# Patient Record
Sex: FEMALE | Race: WHITE
[De-identification: names, ages, dates, MRNs, and addresses within clinical notes are randomized per-mention and may not be internally consistent; named-entity substitution may affect disease eponyms.]

---

## 2018-03-10 NOTE — PHYS DOC
General


Chief Complaint:  DENTAL PROBLEM


Stated Complaint:  DENTAL PAIN


Time Seen by MD:  06:02


Source:  patient


Exam Limitations:  no limitations


Problems:  





History of Present Illness


Initial Comments


22-year-old  2 para 0 female states she is 30 weeks gestation comes to 

the ED complaining of dental pain.


Patient states that she's had long-term problems with her teeth, states that she

's had bilateral lower molar pain which worsened 2 days ago. When asked, she 

admits that she has not tried to contact her obstetrician. Also when asked, she 

states that she's having problems finding a dentist who will accept her [employer] 

[employer] insurance although admittedly shes only called a few.


No fever chills jaw pain headache, patient is able to eat and drink well and 

there is no swelling to cause any potential airway issues. She describes the 

pain as moderate and says the biggest issue with her pain is that it causes her 

to have some nausea although she has not vomited. Through the conversation it 

is revealed that if the nausea can be controlled patient will be satisfied, she 

understands that we use the least medications and pregnancy and avoid narcotics.


Timing/Duration:  gradual, last week


Severity:  moderate


Location:  dental


Prearrival Treatment:  over the counter meds


Modifying Factors:  improves with other


Associated Symptoms:  tooth pain


Allergies:  


Coded Allergies:  


     cefaclor (Verified  Allergy, Unknown, 3/10/18)





Past Medical History


Medical History:  no pertinent history


Surgical History:  noncontributory


LMP (Females 10-50):  pregnant





Social History


Smoker:  non-smoker


Alcohol:  none


Drugs:  none





Constitutional:  denies chills, denies diaphoresis, denies fever, denies malaise


Eyes:  denies blindness, denies blurred vision


Ears:  denies dizziness, denies pain, denies tinnitus


Nose:  denies clots, denies congestion, denies epistaxis


Mouth:  see HPI, denies clots, denies loose teeth, pain, denies swelling, 

denies clear discharge, denies purulent discharge


Throat:  denies pain, denies swelling, denies neck stiffness, denies painful 

swallowing, denies difficulty with fluids


Respiratory:  denies cough, denies shortness of breath


Cardiovascular:  denies chest pain, denies syncope


Gastrointestinal:  denies abdominal pain, denies diarrhea, nausea, denies 

vomiting


Neurological:  denies depressed, denies headache, denies numbness, denies 

paresthesia





Physical Exam


General Appearance:  WD/WN, no apparent distress


Eyes:  bilateral eye normal inspection, bilateral eye PERRL, bilateral eye EOMI


Ears:  bilateral ear auricle normal, bilateral ear canal normal, bilateral ear 

TM normal


Nose:  normal inspection


Mouth/Throat:  other (mild swelling of the gums at the left lower molars no 

discharge or open sores. No bony tenderness or visible dental decay. No other 

soft tissue swelling no airway compromise)


Neck:  normal inspection, trachea midline, limited range of motion


Cardiovascular/Respiratory:  normal breath sounds, no respiratory distress


Neurologic/Psychiatric:  CNs II-XII nml as tested, no motor/sensory deficits, 

alert, normal mood/affect, oriented x 3


Skin:  normal color, warm/dry





Orders, Labs, Meds


Zofran ODT given for the patient's nausea.





The patient called her OB representative from the emergency department and was 

advised to go to dentist today recommended no other prescription pain 

medications be given.


Departure


Time of Disposition:  06:28


Disposition:  01 HOME, SELF-CARE


Diagnosis:  dental caries, pregnancy


Condition:  GOOD


Patient Instructions:  Dental Caries, Medicines During Pregnancy





Additional Instructions:  


Please review the patient education materials given by your RN.


OTC tylenol as needed for pain.


Continue prenatal vitamins.


Listerine gargles three times daily after brushing/flossing.


Rx:  zofran odt, amoxicillin


Contact your obstetrician today regarding possible alternative recommended pain 

meds.


Contact dentist ASAP for resolution of dental problems.


Return to ED with new or changing symptoms.











MAURO PEPPER DO Mar 10, 2018 06:34

## 2018-10-03 NOTE — RAD
Pelvic ultrasound, 10/2/2018:

 

HISTORY: Pelvic pain, possible pregnancy

 

Transabdominal and transvaginal scans were obtained. An IUD is present 

centrally in the uterus. The visualized portion of the central uterine 

echo complex in the fundal region measures 8 mm. No intrauterine 

gestational sac is seen.

 

The right ovary is unremarkable. The left ovary contains a 2.9 cm mass 

which contains a cystic component as well as a hypoechoic component with a

septation. The appearance is most compatible with a hemorrhagic cyst. 

There is blood flow in both ovaries. A small amount of free fluid was 

noted in the cul-de-sac. This amount of fluid can be on a physiologic 

basis. The adnexal regions are otherwise unremarkable.

 

IMPRESSION:

1. An IUD is in place centrally in the uterine cavity.

2. No intrauterine gestation is identified.

3. Complex cyst in the left ovary which is probably a hemorrhagic cyst.

4. Small amount of free fluid in the pelvis.

 

Electronically signed by: Rick Moritz, MD (10/3/2018 9:16 AM) [facility]

## 2018-10-25 NOTE — PHYS DOC
Past History


Past Medical History:  No Pertinent History


Past Surgical History:  , Other


Alcohol Use:  None


Drug Use:  None





Adult General


Chief Complaint


Chief Complaint:  ABDOMINAL PAIN





HPI


HPI





Patient is a 22-year-old female who presents with complaint of right upper 

quadrant abdominal pain that has intermittently been present for the last 

couple of months. Patient states that she is about 5 months postpartum. She 

states that the current episode started about a week ago and states that pain 

has never completely gone away. She states that for the most part the pain is 

only mild but any time she eats, the pain becomes much more severe and she 

develops nausea and vomiting. She rates the pain currently at an 8 out of 10. 

She denies chest pain or shortness of breath. She also denies any fever.





Review of Systems


Review of Systems





Constitutional: Denies fever or chills []


Respiratory: Denies cough or shortness of breath []


Cardiovascular: Denies chest pain[]


GI: Complains of abdominal pain with nausea and vomiting. Denies diarrhea or 

constipation.[]


Musculoskeletal: Denies back pain or joint pain []


Integument: Denies rash or skin lesions []





All other systems were reviewed and found to be within normal limits, except as 

documented in this note.





Allergies


Allergies





Allergies








Coded Allergies Type Severity Reaction Last Updated Verified


 


  cefaclor Allergy Unknown  3/10/18 Yes











Physical Exam


Physical Exam





Constitutional: Well developed, well nourished, no acute distress, non-toxic 

appearance. []


HENT: Normocephalic, atraumatic, bilateral external ears normal, oropharynx 

moist, no oral exudates, nose normal. []


Eyes: PERRLA, EOMI, conjunctiva normal, no discharge. [] 


Neck: Normal range of motion, no tenderness, supple, no stridor. [] 


Cardiovascular:Heart rate regular rhythm, no murmur []


Lungs & Thorax:  Bilateral breath sounds clear to auscultation []


Abdomen: Bowel sounds normal, soft, with epigastric and right upper quadrant 

tenderness. [] 


Skin: Warm, dry, no erythema, no rash. [] 


Extremities: No tenderness, no cyanosis, no clubbing, ROM intact, no edema. [] 


Neurologic: Alert and oriented X 3, normal motor function, normal sensory 

function, no focal deficits noted. []





Current Patient Data


Vital Signs





 Vital Signs








  Date Time  Temp Pulse Resp B/P (MAP) Pulse Ox O2 Delivery O2 Flow Rate FiO2


 


10/25/18 20:09 98.3 100 20  100 Room Air  











EKG


EKG


[]





Radiology/Procedures


Radiology/Procedures


[]


Impressions:


Gallbladder ultrasound is unremarkable.





Course & Med Decision Making


Course & Med Decision Making


Pertinent Labs and Imaging studies reviewed. (See chart for details)





[]





Dragon Disclaimer


Dragon Disclaimer


This electronic medical record was generated, in whole or in part, using a 

voice recognition dictation system.





Departure


Departure:


Impression:  


 Primary Impression:  


 Upper abdominal pain


Disposition:   HOME, SELF-CARE


Condition:  STABLE


Referrals:  


[facility] (PCP)


Patient Instructions:  Abdominal Pain, HIDA (Hepatobilliary) Scan





Additional Instructions:  


Take prescribed medication as directed. Follow up with GI specialist as already 

scheduled.


Scripts


Ondansetron (ZOFRAN ODT) 4 Mg Tab.rapdis


1 TAB SL Q8HRS PRN for NAUSEA/VOMITING, #10 TAB


   Prov: DESMOND SEAMAN Jr. DO         10/25/18











DESMOND SEAMAN Jr. DO Oct 25, 2018 20:31

## 2018-10-25 NOTE — RAD
Indication:upper abd pain  n/v x 2 months, npo x 2 hrs.

 

TECHNIQUE: Grayscale, color Doppler and spectral waveform is of the 

abdomen obtained.

 

COMPARISON: None

 

FINDINGS: The visualized pancreas is within normal limits. Pancreatic tail

not visualized due to overlying bowel gas. IVC is visualized. No aortic 

aneurysm. Main portal vein is patent with hepatopedal flow. Hepatic veins 

are patent. The liver measures 15 cm in longest dimension and is normal in

echogenicity without apparent focal lesion. Right kidney measures 11.2 cm 

in length without hydronephrosis. No gallstones, pericholecystic fluid or 

gallbladder wall thickening. CBD measures 4 mm in diameter and is within 

normal limits.

 

IMPRESSION:

No acute findings.

 

Electronically signed by: Jonah Colin DO (10/25/2018 9:08 PM) [facility]

## 2019-03-04 NOTE — ED.ADGEN
Past History


Past Medical History:  Bipolar, Depression, Migraines


Past Surgical History:  , Tonsillectomy, Other


Additional Smoking Information:  


 PPD


Alcohol Use:  None


Drug Use:  None





Adult General


Chief Complaint


Chief Complaint


headache





HPI


HPI





3 years old female presented to the emergency department with headache 

described as throbbing all over her head similar to the previous headaches she 

had she has migraine this happened at work associate with nausea no vomiting no 

diarrhea no blurry vision no weakness no numbness





Review of Systems


Review of Systems





Constitutional: Denies fever or chills []


Eyes: Denies change in visual acuity, redness, or eye pain []


HENT: Denies nasal congestion or sore throat []


Respiratory: Denies cough or shortness of breath []


Cardiovascular: No additional information not addressed in HPI []


GI: Denies abdominal pain, nausea, vomiting, bloody stools or diarrhea []


: Denies dysuria or hematuria []


Musculoskeletal: Denies back pain or joint pain []


Integument: Denies rash or skin lesions []


Neurologic: Denies  focal weakness or sensory changes []


Endocrine: Denies polyuria or polydipsia []





All other systems were reviewed and found to be within normal limits, except as 

documented in this note.





Current Medications


Current Medications





Current Medications








 Medications


  (Trade)  Dose


 Ordered  Sig/Kina  Start Time


 Stop Time Status Last Admin


Dose Admin


 


 Ketorolac


 Tromethamine


  (Toradol Im)  60 mg  1X  ONCE  3/4/19 05:00


 3/4/19 05:01 UNV  





 


 Prochlorperazine


 Edisylate


  (Compazine)  10 mg  1X  ONCE  3/4/19 05:00


 3/4/19 05:01 UNV  














Allergies


Allergies





Allergies








Coded Allergies Type Severity Reaction Last Updated Verified


 


  cefaclor Allergy Unknown  3/10/18 Yes











Physical Exam


Physical Exam





Constitutional: Well developed, well nourished, no acute distress, non-toxic 

appearance. []


HENT: Normocephalic, atraumatic, bilateral external ears normal, oropharynx 

moist, no oral exudates, nose normal. []


Eyes: PERRLA, EOMI, conjunctiva normal, no discharge. [] 


Neck: Normal range of motion, no tenderness, supple, no stridor. [] 


Cardiovascular:Heart rate regular rhythm, no murmur []


Lungs & Thorax:  Bilateral breath sounds clear to auscultation []


Abdomen: Bowel sounds normal, soft, no tenderness, no masses, no pulsatile 

masses. [] 


Skin: Warm, dry, no erythema, no rash. [] 


Back: No tenderness, no CVA tenderness. [] 


Extremities: No tenderness, no cyanosis, no clubbing, ROM intact, no edema. [] 


Neurologic: Alert and oriented X 3, normal motor function, normal sensory 

function, no focal deficits noted. []


Psychologic: Affect normal, judgement normal, mood normal. []





Current Patient Data


Vital Signs





 Vital Signs








  Date Time  Temp Pulse Resp B/P (MAP) Pulse Ox O2 Delivery O2 Flow Rate FiO2


 


3/4/19 04:42 97.8 97 20  98 Room Air  











EKG


EKG


[]





Radiology/Procedures


Radiology/Procedures


[]





Course & Med Decision Making


Course & Med Decision Making


Pertinent Labs and Imaging studies reviewed. (See chart for details)





[]





Final Impression


Final Impression


[]


Problems:  


(1) Headache


Qualifiers:  


   Qualified Codes:  R51 - Headache





Dragon Disclaimer


Dragon Disclaimer


This electronic medical record was generated, in whole or in part, using a 

voice recognition dictation system.











LYN LOPEZ MD Mar 4, 2019 04:58

## 2019-07-26 NOTE — RAD
CT ABD PELV W/ IV CONTRST ONLY

 

INDICATION: Abdominal pain

 

EXAM: CT of the abdomen and pelvis was obtained after intravenous 

administration of 75 mL Omnipaque 300.  Coronal and sagittal reformatted 

images were performed.

 

PQRS compliance statement:

 

One or more of the following individualized dose reduction techniques were

utilized for this examination:

1. Automated exposure control

2. Adjustment of the mA and/or kV according to patient size

3. Use of iterative reconstruction technique

 

COMPARISON: Abdominal ultrasound 10/25/2018. Pelvic ultrasound 10/2/2018

 

FINDINGS: No free air.

 

Lower chest: The visualized lower lungs are aerated. No pleural or 

pericardial effusion.

 

ABDOMEN:

Liver: The noncontrast liver is homogeneous in attenuation.

 

Gallbladder and biliary: Contracted gallbladder. No radiopaque stone. 

Normal caliber bile ducts.

 

Spleen: Normal spleen.

 

Pancreas: The noncontrast pancreas is homogeneous in attenuation without 

peripancreatic inflammatory changes.

 

Adrenal glands: Normal adrenal glands.

 

Kidneys and ureters: No opaque urinary calculi. Normal kidneys and 

ureters.

 

GI tract: The stomach is decompressed and poorly evaluated. Wall 

thickening involving the duodenum and jejunum. Normal caliber colon. 

Normal appendix with small amount of luminal fluid and gas.

 

Vascular structures: Normal caliber abdominal aorta.

 

Lymph nodes: No lymphadenopathy in the abdomen or pelvis.

 

PELVIS:

Genitourinary system: Normal bladder. Retroflexed uterus with IUD in 

place. Incompletely characterized small ovarian cysts. Trace pelvic free 

fluid, likely physiologic.

 

SKELETAL STRUCTURES AND SOFT TISSUES: No fracture or destructive lesion in

the visualized skeleton.

 

IMPRESSION:  

 

1. Prominent wall thickening involving the duodenum and jejunum, which may

represent nonspecific enteritis.

 

2. Small bilateral ovarian cysts, incompletely characterized by CT.

 

Electronically signed by: Domenico Herrera MD (7/26/2019 4:35 PM) 

[facility]

## 2019-07-26 NOTE — PHYS DOC
Past History


Past Medical History:  Bipolar, Depression, Migraines


Past Surgical History:  , Tonsillectomy, Other


Alcohol Use:  None


Drug Use:  None





Adult General


Chief Complaint


Chief Complaint:  ABDOMINAL PAIN





HPI


HPI


23-year-old female presents with abdominal pain. She has been having low 

abdominal pain for about the last 5 days. It is intermittent. The patient was 

seen at another emergency room and diagnosed with an ovarian cyst that was 

leaking. She was given ibuprofen and some other medication. It was helping, but 

the pain has increased since last night. She called her PCP for an appointment 

and he sent her here. She denies fever or chills. She is tender to palpation of 

the lower abdomen. She does not know what her lab work was at the other 

hospital. They did an ultrasound, but not a CT. The patient has an IUD.





Review of Systems


Review of Systems





Constitutional: Denies fever or chills []


Eyes: Denies change in visual acuity, redness, or eye pain []


HENT: Denies nasal congestion or sore throat []


Respiratory: Denies cough or shortness of breath []


Cardiovascular: No additional information not addressed in HPI []


GI: Suprapubic abdominal pain.  No nausea, vomiting, bloody stools or diarrhea 

[]


: Denies dysuria or hematuria []


Musculoskeletal: Denies back pain or joint pain []


Integument: Denies rash or skin lesions []


Neurologic: Denies headache, focal weakness or sensory changes []


Endocrine: Denies polyuria or polydipsia []





All other systems were reviewed and found to be within normal limits, except as 

documented in this note.





Allergies


Allergies





Allergies








Coded Allergies Type Severity Reaction Last Updated Verified


 


  cefaclor Allergy Unknown  3/10/18 Yes











Physical Exam


Physical Exam





Constitutional: Well developed, well nourished, no acute distress, non-toxic 

appearance. []


HENT: Normocephalic, atraumatic, bilateral external ears normal, oropharynx 

moist, no oral exudates, nose normal. []


Eyes: PERRLA, EOMI, conjunctiva normal, no discharge. [] 


Neck: Normal range of motion, no tenderness, supple, no stridor. [] 


Cardiovascular:Heart rate regular rhythm, no murmur []


Lungs & Thorax:  Bilateral breath sounds clear to auscultation []


Abdomen: Bowel sounds normal, soft, moderate suprapubic tenderness, no masses, 

no pulsatile masses. [] 


Skin: Warm, dry, no erythema, no rash. [] 


Back: No tenderness, no CVA tenderness. [] 


Extremities: No tenderness, no cyanosis, no clubbing, ROM intact, no edema. [] 


Neurologic: Alert and oriented X 3, normal motor function, normal sensory 

function, no focal deficits noted. []


Psychologic: Affect normal, judgement normal, mood normal. []





Current Patient Data


Vital Signs





                                   Vital Signs








  Date Time  Temp Pulse Resp B/P (MAP) Pulse Ox O2 Delivery O2 Flow Rate FiO2


 


19 14:10 98.6 89 16  96 Room Air  











EKG


EKG


[]





Radiology/Procedures


Radiology/Procedures


[]


Impressions:


CT ABD PELV W/ IV CONTRST ONLY


 


INDICATION: Abdominal pain


 


EXAM: CT of the abdomen and pelvis was obtained after intravenous 


administration of 75 mL Omnipaque 300.  Coronal and sagittal reformatted 


images were performed.


 


PQRS compliance statement:


 


One or more of the following individualized dose reduction techniques were


utilized for this examination:


1. Automated exposure control


2. Adjustment of the mA and/or kV according to patient size


3. Use of iterative reconstruction technique


 


COMPARISON: Abdominal ultrasound 10/25/2018. Pelvic ultrasound 10/2/2018


 


FINDINGS: No free air.


 


Lower chest: The visualized lower lungs are aerated. No pleural or 


pericardial effusion.


 


ABDOMEN:


Liver: The noncontrast liver is homogeneous in attenuation.


 


Gallbladder and biliary: Contracted gallbladder. No radiopaque stone. 


Normal caliber bile ducts.


 


Spleen: Normal spleen.


 


Pancreas: The noncontrast pancreas is homogeneous in attenuation without 


peripancreatic inflammatory changes.


 


Adrenal glands: Normal adrenal glands.


 


Kidneys and ureters: No opaque urinary calculi. Normal kidneys and 


ureters.


 


GI tract: The stomach is decompressed and poorly evaluated. Wall 


thickening involving the duodenum and jejunum. Normal caliber colon. 


Normal appendix with small amount of luminal fluid and gas.


 


Vascular structures: Normal caliber abdominal aorta.


 


Lymph nodes: No lymphadenopathy in the abdomen or pelvis.


 


PELVIS:


Genitourinary system: Normal bladder. Retroflexed uterus with IUD in 


place. Incompletely characterized small ovarian cysts. Trace pelvic free 


fluid, likely physiologic.


 


SKELETAL STRUCTURES AND SOFT TISSUES: No fracture or destructive lesion in


the visualized skeleton.


 


IMPRESSION:  


 


1. Prominent wall thickening involving the duodenum and jejunum, which may


represent nonspecific enteritis.


 


2. Small bilateral ovarian cysts, incompletely characterized by CT.


 


Electronically signed by: Domenico Herrera MD (2019 4:35 PM) 


[facility]





Course & Med Decision Making


Course & Med Decision Making


Pertinent Labs and Imaging studies reviewed. (See chart for details)


The patient's labs are unremarkable. Her urinalysis is negative for infection. 

Her CT shows some thickening of the duodenum. See official report for details. 

This suggests enteritis. I will treat her with Augmentin for 7 days in case this

 is bacterial in nature. The patient is stable for discharge at this time.


[]





Dragon Disclaimer


Dragon Disclaimer


This electronic medical record was generated, in whole or in part, using a voice

 recognition dictation system.





Departure


Departure:


Impression:  


   Primary Impression:  


   Enteritis


Disposition:  01 HOME, SELF-CARE


Condition:  STABLE


Referrals:  


[facility] OC[facility]PO PAC (PCP)


Scripts


Amoxicillin/Potassium Clav (AUGMENTIN 875-125 TABLET) 1 Each Tablet


1 TAB PO BID for enteritis, #14 TAB


   Prov: FRANCISCA MAE DO         19











FRANCISCA MAE DO                 2019 14:59

## 2019-10-15 ENCOUNTER — HOSPITAL ENCOUNTER (EMERGENCY)
Dept: HOSPITAL 63 - ER | Age: 24
Discharge: HOME | End: 2019-10-15
Payer: COMMERCIAL

## 2019-10-15 VITALS — DIASTOLIC BLOOD PRESSURE: 87 MMHG | SYSTOLIC BLOOD PRESSURE: 130 MMHG

## 2019-10-15 VITALS — WEIGHT: 150 LBS | HEIGHT: 64 IN | BODY MASS INDEX: 25.61 KG/M2
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
## 2019-10-15 DIAGNOSIS — N39.0: ICD-10-CM

## 2019-10-15 DIAGNOSIS — G43.909: ICD-10-CM

## 2019-10-15 DIAGNOSIS — Z88.1: ICD-10-CM

## 2019-10-15 DIAGNOSIS — S06.0X0A: Primary | ICD-10-CM
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
## 2019-10-15 DIAGNOSIS — F31.9: ICD-10-CM
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
## 2019-10-15 DIAGNOSIS — R42: ICD-10-CM

## 2019-10-15 LAB
APTT PPP: (no result) S
BACTERIA #/AREA URNS HPF: (no result) /HPF
BILIRUB UR QL STRIP: (no result)
FIBRINOGEN PPP-MCNC: (no result) MG/DL
GLUCOSE UR STRIP-MCNC: (no result) MG/DL
NITRITE UR QL STRIP: (no result)
RBC #/AREA URNS HPF: (no result) /HPF (ref 0–2)
SP GR UR STRIP: 1.01
SQUAMOUS #/AREA URNS LPF: (no result) /LPF
UROBILINOGEN UR-MCNC: 0.2 MG/DL
WBC #/AREA URNS HPF: (no result) /HPF (ref 0–4)

## 2019-10-15 PROCEDURE — 70486 CT MAXILLOFACIAL W/O DYE: CPT

## 2019-10-15 PROCEDURE — 72125 CT NECK SPINE W/O DYE: CPT

## 2019-10-15 PROCEDURE — 70450 CT HEAD/BRAIN W/O DYE: CPT

## 2019-10-15 PROCEDURE — 81001 URINALYSIS AUTO W/SCOPE: CPT

## 2019-10-15 PROCEDURE — 99285 EMERGENCY DEPT VISIT HI MDM: CPT

## 2019-10-15 PROCEDURE — 87086 URINE CULTURE/COLONY COUNT: CPT

## 2019-10-15 PROCEDURE — 81025 URINE PREGNANCY TEST: CPT

## 2019-10-15 RX ADMIN — OXYCODONE HYDROCHLORIDE AND ACETAMINOPHEN ONE TAB: 5; 325 TABLET ORAL at 18:30

## 2019-10-15 RX ADMIN — OXYCODONE HYDROCHLORIDE AND ACETAMINOPHEN ONE TAB: 5; 325 TABLET ORAL at 18:43
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
